# Patient Record
Sex: MALE | Race: WHITE | URBAN - METROPOLITAN AREA
[De-identification: names, ages, dates, MRNs, and addresses within clinical notes are randomized per-mention and may not be internally consistent; named-entity substitution may affect disease eponyms.]

---

## 2021-12-29 ENCOUNTER — OFFICE VISIT (OUTPATIENT)
Dept: URBAN - METROPOLITAN AREA CLINIC 19 | Facility: CLINIC | Age: 21
End: 2021-12-29
Payer: COMMERCIAL

## 2021-12-29 ENCOUNTER — WEB ENCOUNTER (OUTPATIENT)
Dept: URBAN - METROPOLITAN AREA CLINIC 19 | Facility: CLINIC | Age: 21
End: 2021-12-29

## 2021-12-29 DIAGNOSIS — R19.7 DIARRHEA, UNSPECIFIED: ICD-10-CM

## 2021-12-29 DIAGNOSIS — K90.9 INTESTINAL MALABSORPTION, UNSPECIFIED: ICD-10-CM

## 2021-12-29 PROBLEM — 62315008: Status: ACTIVE | Noted: 2021-12-29

## 2021-12-29 PROCEDURE — 99203 OFFICE O/P NEW LOW 30 MIN: CPT | Performed by: STUDENT IN AN ORGANIZED HEALTH CARE EDUCATION/TRAINING PROGRAM

## 2021-12-29 NOTE — HPI-TODAY'S VISIT:
The pt is a 21 yr old with migratory abdominal pain followed by diarrhea intermittently.  No nausea, vomiting.  No melena or hematochezia.  Peptobismol helps.  No celiac or thyroid disease in the family.  Grandfather with IDDM.  Sx x 1 -2 years.  No joint aches or pains.  No rashes.  Happens a lot with dairy products.    No infectious exposure, no travel.  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

## 2021-12-31 LAB
DEAMIDATED GLIADIN ABS, IGA: 4
HEMATOCRIT: 52.1
HEMOGLOBIN: 16.7
IMMUNOGLOBULIN A, QN, SERUM: 83
MCH: 27.7
MCHC: 32.1
MCV: 87
NRBC: (no result)
PLATELETS: 181
RBC: 6.02
RDW: 12.8
T-TRANSGLUTAMINASE (TTG) IGA: 2
TSH: 3.32
WBC: 4.6

## 2022-02-09 ENCOUNTER — OFFICE VISIT (OUTPATIENT)
Dept: URBAN - METROPOLITAN AREA CLINIC 19 | Facility: CLINIC | Age: 22
End: 2022-02-09

## 2024-01-29 ENCOUNTER — OFFICE VISIT (OUTPATIENT)
Dept: URBAN - METROPOLITAN AREA CLINIC 23 | Facility: CLINIC | Age: 24
End: 2024-01-29
Payer: COMMERCIAL

## 2024-01-29 VITALS
HEIGHT: 69 IN | TEMPERATURE: 98.6 F | BODY MASS INDEX: 21.62 KG/M2 | HEART RATE: 117 BPM | WEIGHT: 146 LBS | SYSTOLIC BLOOD PRESSURE: 142 MMHG | DIASTOLIC BLOOD PRESSURE: 80 MMHG

## 2024-01-29 DIAGNOSIS — K59.01 CONSTIPATION: ICD-10-CM

## 2024-01-29 DIAGNOSIS — K64.8 INTERNAL HEMORRHOID: ICD-10-CM

## 2024-01-29 DIAGNOSIS — R58 BLOOD ON TOILET PAPER: ICD-10-CM

## 2024-01-29 PROBLEM — 14760008: Status: ACTIVE | Noted: 2024-01-29

## 2024-01-29 PROCEDURE — 99203 OFFICE O/P NEW LOW 30 MIN: CPT | Performed by: NURSE PRACTITIONER

## 2024-01-29 NOTE — HPI-TODAY'S VISIT:
22 yo male presents for gi symptoms. Reports gas and intermittnet constipation. He saw blood on the toilet paper x 1 two weeks ago that has resolved since. He sits long for work. Tends to strain for BM. Had some rectal discomfort. Denies abd pain or weight loss. No fh gi malignancy.

## 2024-01-29 NOTE — PHYSICAL EXAM GASTROINTESTINAL
soft, nontender, nondistended, no rebound or guarding, noted moderate internal hemorrhoid banding PRE.

## 2024-05-15 ENCOUNTER — OFFICE VISIT (OUTPATIENT)
Dept: URBAN - METROPOLITAN AREA CLINIC 19 | Facility: CLINIC | Age: 24
End: 2024-05-15
Payer: COMMERCIAL

## 2024-05-15 ENCOUNTER — DASHBOARD ENCOUNTERS (OUTPATIENT)
Age: 24
End: 2024-05-15

## 2024-05-15 VITALS
BODY MASS INDEX: 19.99 KG/M2 | OXYGEN SATURATION: 100 % | TEMPERATURE: 98.6 F | SYSTOLIC BLOOD PRESSURE: 110 MMHG | WEIGHT: 135 LBS | DIASTOLIC BLOOD PRESSURE: 60 MMHG | HEIGHT: 69 IN | HEART RATE: 77 BPM

## 2024-05-15 DIAGNOSIS — K64.8 INTERNAL HEMORRHOID: ICD-10-CM

## 2024-05-15 DIAGNOSIS — K59.09 OTHER CONSTIPATION: ICD-10-CM

## 2024-05-15 PROCEDURE — 99212 OFFICE O/P EST SF 10 MIN: CPT

## 2024-05-15 RX ORDER — HYDROCORTISONE ACETATE 25 MG/1
1 SUPPOSITORY SUPPOSITORY RECTAL TWICE A DAY
Qty: 14 | Refills: 2 | OUTPATIENT
Start: 2024-05-15 | End: 2024-06-05